# Patient Record
Sex: MALE
[De-identification: names, ages, dates, MRNs, and addresses within clinical notes are randomized per-mention and may not be internally consistent; named-entity substitution may affect disease eponyms.]

---

## 2019-03-03 ENCOUNTER — HOSPITAL ENCOUNTER (EMERGENCY)
Dept: HOSPITAL 31 - C.ER | Age: 49
Discharge: HOME | End: 2019-03-03
Payer: COMMERCIAL

## 2019-03-03 VITALS
HEART RATE: 64 BPM | DIASTOLIC BLOOD PRESSURE: 62 MMHG | TEMPERATURE: 98.6 F | SYSTOLIC BLOOD PRESSURE: 159 MMHG | OXYGEN SATURATION: 96 %

## 2019-03-03 VITALS — RESPIRATION RATE: 17 BRPM

## 2019-03-03 VITALS — BODY MASS INDEX: 35.4 KG/M2

## 2019-03-03 DIAGNOSIS — N23: Primary | ICD-10-CM

## 2019-03-03 LAB
ALBUMIN SERPL-MCNC: 4.5 G/DL (ref 3.5–5)
ALBUMIN/GLOB SERPL: 1.4 {RATIO} (ref 1–2.1)
ALT SERPL-CCNC: 75 U/L (ref 21–72)
AST SERPL-CCNC: 49 U/L (ref 17–59)
BASOPHILS # BLD AUTO: 0.1 K/UL (ref 0–0.2)
BASOPHILS NFR BLD: 0.7 % (ref 0–2)
BILIRUB UR-MCNC: NEGATIVE MG/DL
BUN SERPL-MCNC: 21 MG/DL (ref 9–20)
CALCIUM SERPL-MCNC: 8.5 MG/DL (ref 8.6–10.4)
EOSINOPHIL # BLD AUTO: 0.2 K/UL (ref 0–0.7)
EOSINOPHIL NFR BLD: 1.8 % (ref 0–4)
ERYTHROCYTE [DISTWIDTH] IN BLOOD BY AUTOMATED COUNT: 13.6 % (ref 11.5–14.5)
GFR NON-AFRICAN AMERICAN: > 60
GLUCOSE UR STRIP-MCNC: NORMAL MG/DL
HGB BLD-MCNC: 14.6 G/DL (ref 12–18)
LEUKOCYTE ESTERASE UR-ACNC: (no result) LEU/UL
LIPASE: 56 U/L (ref 23–300)
LYMPHOCYTES # BLD AUTO: 2.7 K/UL (ref 1–4.3)
LYMPHOCYTES NFR BLD AUTO: 29.3 % (ref 20–40)
MCH RBC QN AUTO: 30.2 PG (ref 27–31)
MCHC RBC AUTO-ENTMCNC: 33.7 G/DL (ref 33–37)
MCV RBC AUTO: 89.8 FL (ref 80–94)
MONOCYTES # BLD: 0.6 K/UL (ref 0–0.8)
MONOCYTES NFR BLD: 6.1 % (ref 0–10)
NEUTROPHILS # BLD: 5.8 K/UL (ref 1.8–7)
NEUTROPHILS NFR BLD AUTO: 62.1 % (ref 50–75)
NRBC BLD AUTO-RTO: 0 % (ref 0–2)
PH UR STRIP: 5 [PH] (ref 5–8)
PLATELET # BLD: 263 K/UL (ref 130–400)
PMV BLD AUTO: 9.7 FL (ref 7.2–11.7)
PROT UR STRIP-MCNC: (no result) MG/DL
RBC # BLD AUTO: 4.83 MIL/UL (ref 4.4–5.9)
RBC # UR STRIP: (no result) /UL
SP GR UR STRIP: 1.03 (ref 1–1.03)
SQUAMOUS EPITHIAL: < 1 /HPF (ref 0–5)
UROBILINOGEN UR-MCNC: NORMAL MG/DL (ref 0.2–1)
WBC # BLD AUTO: 9.3 K/UL (ref 4.8–10.8)

## 2019-03-03 PROCEDURE — 74176 CT ABD & PELVIS W/O CONTRAST: CPT

## 2019-03-03 PROCEDURE — 99285 EMERGENCY DEPT VISIT HI MDM: CPT

## 2019-03-03 PROCEDURE — 96375 TX/PRO/DX INJ NEW DRUG ADDON: CPT

## 2019-03-03 PROCEDURE — 87086 URINE CULTURE/COLONY COUNT: CPT

## 2019-03-03 PROCEDURE — 81001 URINALYSIS AUTO W/SCOPE: CPT

## 2019-03-03 PROCEDURE — 96374 THER/PROPH/DIAG INJ IV PUSH: CPT

## 2019-03-03 PROCEDURE — 83690 ASSAY OF LIPASE: CPT

## 2019-03-03 PROCEDURE — 80053 COMPREHEN METABOLIC PANEL: CPT

## 2019-03-03 PROCEDURE — 85025 COMPLETE CBC W/AUTO DIFF WBC: CPT

## 2019-03-03 NOTE — CT
Date of service: 



03/03/2019



PROCEDURE:  CT Abdomen and pelvis 



HISTORY:

Abdominal pain 



COMPARISON:

None.



TECHNIQUE:

Contiguous axial images of the abdomen and pelvis performed without 

oral or intravenous contrast material.  Additional 2D   sagittal and 

coronal reformats generated.  Reformats generated. 



Radiation dose:



Total exam DLP = 1196.17 mGy-cm.



This CT exam was performed using one or more of the following dose 

reduction techniques: Automated exposure control, adjustment of the 

mA and/or kV according to patient size, and/or use of iterative 

reconstruction technique.



FINDINGS:



LOWER THORAX:

Mild passive/dependent type atelectasis both posterior lower lung 

fields. 



Cardiomegaly. 



Small hiatal hernia. 



LIVER:

Unremarkable. No gross lesion or ductal dilatation.



GALLBLADDER AND BILE DUCTS:

Unremarkable. 



PANCREAS:

Unremarkable. No mass. No ductal dilatation.



SPLEEN:

Unremarkable. No splenomegaly. 



ADRENALS:

Unremarkable. 



KIDNEYS AND URETERS:

There is a 4.6 mm calculus which appears to be located in the distal 

right ureter just caudal to the pelvic rim region best seen on axial 

series 3, image number 136.  Mild right-sided hydronephrosis.  



BLADDER:

Urinary bladder incompletely distended which in part accounts for 

thick-walled appearance.  Muscular hypertrophy may contribute.



REPRODUCTIVE:

Prostate gland appears enlarged measuring approximately 5.6 cm in 

transverse dimension. 



APPENDIX:

Appendix is unremarkable. 



BOWEL:

Unremarkable. No obstruction. No gross mural thickening. 



PERITONEUM:

Unremarkable. No fluid collection. No free air.  Small fat containing 

umbilical hernia. 



LYMPH NODES:

Unremarkable. No enlarged lymph nodes. 



VASCULATURE:

Unremarkable. No aortic aneurysm. No aortic atherosclerotic 

calcification or mural plaque present.



BONES:

Mild multilevel degenerative spondylosis of the lower thoracic and 

lumbar spine. 



OTHER FINDINGS:

None. 



IMPRESSION:

4.6 mm calculus distal right ureter with mild right-sided 

hydronephrosis. 



Urinary bladder incompletely distended which in part accounts for 

thick-walled appearance.  Muscular hypertrophy presumably 

contributes.  Correlation with urinalysis recommended to exclude 

cystitis. 



Enlarged prostate gland findings likely due to BPH however 

correlation PSA recommended.

## 2019-03-03 NOTE — C.PDOC
History Of Present Illness





48 years old male presents to ED for complaints of sharp right flank pain that 

began today morning. Patient reports pain radiates to right lower quadrant and 

mid abdomen, Denies diarrhea, vomiting, or any other physical complaints. 


Time Seen by Provider: 03/03/19 07:31


Chief Complaint (Nursing): Abdominal Pain


History Per: Patient


History/Exam Limitations: no limitations


Onset/Duration Of Symptoms: Hrs


Current Symptoms Are (Timing): Still Present


Location Of Pain/Discomfort: RLQ


Radiation Of Pain To:: Flank


Quality Of Discomfort: Sharp


Associated Symptoms: denies: Fever, Chills, Nausea, Vomiting, Diarrhea


Exacerbating Factors: None


Alleviating Factors: None


Last Bowel Movement: Today


Recent travel outside of the [place]: No





Past Medical History


Reviewed: Historical Data, Nursing Documentation, Vital Signs


Vital Signs: 





                                Last Vital Signs











Temp  98.5 F   03/03/19 07:28


 


Pulse  65   03/03/19 07:28


 


Resp  18   03/03/19 07:28


 


BP  166/81 H  03/03/19 07:28


 


Pulse Ox  97   03/03/19 07:28














- Medical History


PMH: No Chronic Diseases


Surgical History: No Surg Hx


Family History: States: No Known Family Hx





- Social History


Hx Alcohol Use: No


Hx Substance Use: No





- Immunization History


Hx Tetanus Toxoid Vaccination: No


Hx Influenza Vaccination: No


Hx Pneumococcal Vaccination: No





Review Of Systems


Except As Marked, All Systems Reviewed And Found Negative.


Constitutional: Negative for: Fever, Chills


Gastrointestinal: Positive for: Abdominal Pain (RLQ ), Other (Right Flank Pain 

).  Negative for: Nausea, Vomiting, Diarrhea


Genitourinary: Negative for: Dysuria, Hematuria


Skin: Negative for: Rash


Neurological: Negative for: Weakness, Numbness





Physical Exam





- Physical Exam


Appears: Non-toxic, No Acute Distress


Skin: Normal Color, Warm, Dry, No Rash


Head: Atraumatic, Normacephalic


Eye(s): bilateral: Normal Inspection, PERRL, EOMI


Oral Mucosa: Moist


Neck: Normal ROM, Supple


Chest: Symmetrical, No Tenderness


Cardiovascular: Rhythm Regular, No Murmur


Respiratory: Normal Breath Sounds, No Rales, No Rhonchi, No Wheezing


Gastrointestinal/Abdominal: Soft, Tenderness (Diffuse RLQ ), No Guarding, No 

Rebound


Back: CVA Tenderness (Right )


Extremity: Normal ROM


Extremity: Bilateral: Atraumatic, Normal Color And Temperature, Normal ROM


Pulses: Left Radial: Normal, Right Radial: Normal


Neurological/Psych: Oriented x3, Normal Speech


Gait: Steady





ED Course And Treatment





- Laboratory Results


Result Diagrams: 


                                 03/03/19 09:13





                                 03/03/19 09:13


O2 Sat by Pulse Oximetry: 97 (RA)


Pulse Ox Interpretation: Normal





- CT Scan/US


  ** CT Abdomen/Pelvis


Other Rad Studies (CT/US): Read By Radiologist, Radiology Report Reviewed


CT/US Interpretation: Date of service:  03/03/2019.  PROCEDURE:  CT Abdomen and 

pelvis.  HISTORY:  Abdominal pain.  COMPARISON:  None.  TECHNIQUE:  Contiguous 

axial images of the abdomen and pelvis performed without oral or intravenous 

contrast material.  Additional 2D   sagittal and coronal reformats generated.  

Reformats generated.  Radiation dose:  Total exam DLP = 1196.17 mGy-cm.  This CT

exam was performed using one or more of the following dose reduction techniques:

Automated exposure control, adjustment of the mA and/or kV according to patient 

size, and/or use of iterative reconstruction technique.  FINDINGS:  LOWER 

THORAX:  Mild passive/dependent type atelectasis both posterior lower lung 

fields.  Cardiomegaly.  Small hiatal hernia.  LIVER:  Unremarkable. No gross 

lesion or ductal dilatation.  GALLBLADDER AND BILE DUCTS:  Unremarkable.  

PANCREAS:  Unremarkable. No mass. No ductal dilatation.  SPLEEN:  Unremarkable. 

No splenomegaly.  ADRENALS:  Unremarkable.  KIDNEYS AND URETERS:  There is a 4.6

mm calculus which appears to be located in the distal right ureter just caudal 

to the pelvic rim region best seen on axial series 3, image number 136.  Mild 

right-sided hydronephrosis.  BLADDER:  Urinary bladder incompletely distended 

which in part accounts for thick-walled appearance.  Muscular hypertrophy may 

contribute.  REPRODUCTIVE:  Prostate gland appears enlarged measuring 

approximately 5.6 cm in transverse dimension.  APPENDIX:  Appendix is 

unremarkable.  BOWEL:  Unremarkable. No obstruction. No gross mural thickening. 

PERITONEUM:  Unremarkable. No fluid collection. No free air.  Small fat c

ontaining umbilical hernia.  LYMPH NODES:  Unremarkable. No enlarged lymph 

nodes.  VASCULATURE:  Unremarkable. No aortic aneurysm. No aortic 

atherosclerotic calcification or mural plaque present.  BONES:  Mild multilevel 

degenerative spondylosis of the lower thoracic and lumbar spine.  OTHER FINDING

S:  None.  IMPRESSION:  4.6 mm calculus distal right ureter with mild right-

sided hydronephrosis.  Urinary bladder incompletely distended which in part 

accounts for thick-walled appearance.  Muscular hypertrophy presumably 

contributes.  Correlation with urinalysis recommended to exclude cystitis.  E

nlarged prostate gland findings likely due to BPH however correlation PSA 

recommended.





Medical Decision Making


Medical Decision Making: 





Plan:


* IV Fluids 


* Zofran 


* Toradol


* Urine CUlture


* Urinalysis 


* Blood work


* CT Abdomen & Pelvis 





CT:


* Patient has a kidney stone 4mm and distal ureter. 





9:30AM:


Patient states pain resolved upon re-evaluation. Patient is non-tender. Patient 

has no UTI or white blood cell count abnormalities. Patient advised to follow up

with urologist. Return if symptoms persist or worsen. Patient is currently 

stable for discharge and will be discharged. 





Disposition


Counseled Patient/Family Regarding: Studies Performed, Diagnosis, Need For 

Followup, Rx Given





- Disposition


Referrals: 


Isidoro Swanson MD [Staff Provider] - 


Disposition: HOME/ ROUTINE


Disposition Time: 09:36


Condition: STABLE


Prescriptions: 


Ibuprofen [Motrin] 600 mg PO TID #20 tab


Ondansetron ODT [Zofran ODT] 4 mg PO TID #12 odt


oxyCODONE/Acetaminophen [Percocet 5/325 mg Tab] 1 ea PO TID #6 tab


Tamsulosin HCl [Flomax] 0.4 mg PO DAILY #10 cap.er.24h


Instructions:  Renal Colic (DC)


Forms:  [facility] (English)





- POA


Present On Arrival: None





- Clinical Impression


Clinical Impression: 


 Ureter colic








- Scribe Statement


The provider has reviewed the documentation as recorded by the Katherynibnelsy Barnett





All medical record entries made by the Katherynibnelsy were at my direction and 

personally dictated by me. I have reviewed the chart and agree that the record 

accurately reflects my personal performance of the history, physical exam, 

medical decision making, and the department course for this patient. I have also

 personally directed, reviewed, and agree with the discharge instructions and 

disposition.